# Patient Record
Sex: FEMALE | URBAN - METROPOLITAN AREA
[De-identification: names, ages, dates, MRNs, and addresses within clinical notes are randomized per-mention and may not be internally consistent; named-entity substitution may affect disease eponyms.]

---

## 2022-05-05 ENCOUNTER — OFFICE VISIT (OUTPATIENT)
Dept: INTERNAL MEDICINE | Facility: CLINIC | Age: 37
End: 2022-05-05

## 2022-05-05 VITALS
DIASTOLIC BLOOD PRESSURE: 90 MMHG | SYSTOLIC BLOOD PRESSURE: 138 MMHG | OXYGEN SATURATION: 99 % | WEIGHT: 177.8 LBS | BODY MASS INDEX: 28.57 KG/M2 | HEIGHT: 66 IN | TEMPERATURE: 97.8 F | HEART RATE: 74 BPM

## 2022-05-05 DIAGNOSIS — Z00.00 ROUTINE ADULT HEALTH MAINTENANCE: Primary | ICD-10-CM

## 2022-05-05 DIAGNOSIS — E66.3 OVERWEIGHT WITH BODY MASS INDEX (BMI) OF 28 TO 28.9 IN ADULT: ICD-10-CM

## 2022-05-05 PROCEDURE — 99385 PREV VISIT NEW AGE 18-39: CPT | Performed by: NURSE PRACTITIONER

## 2022-05-05 NOTE — ASSESSMENT & PLAN NOTE
Encouraged 150-300 minutes weekly exercise.   Continue with healthy diet choices.   Labs ordered.   Pap smear due 2024.   Continue with monthly self breast examinations.   Anticipatory guidance given regarding health prevention/wellness, diet/exercise, tobacco/alcohol/drug education, exercise and wellbeing, covid 19 guidance, and sexual health/STD education.

## 2022-05-05 NOTE — PROGRESS NOTES
"Chief Complaint  Establish Care/Annual exam    Subjective          Yi Gongora presents to [facility]  History of Present Illness  This is a 38 y/o female presenting to office for annual exam and establishment of care. Patient is currently  and has 2 children. Patient currently works as a teacher.     Patient reports walking as exercise. Patient reports following healthy diet.     Patient denies tobacco use. Patient denies alcohol use.     Patient reports last pap smear completed back in July 2021. Patient reports she has a mirena IUD. Patient reports checking breasts every month.     Patient has complaints of recent struggles with weight loss-- we discussed caloric restriction and increasing exercise. Patent offered dietician consultation-- patient would like to wait on this.     Objective   Vital Signs:  /90 (BP Location: Right arm, Patient Position: Sitting, Cuff Size: Adult)   Pulse 74   Temp 97.8 °F (36.6 °C) (Temporal)   Ht 167.6 cm (66\")   Wt 80.6 kg (177 lb 12.8 oz)   SpO2 99%   BMI 28.70 kg/m²           Physical Exam  Vitals and nursing note reviewed.   Constitutional:       Appearance: Normal appearance.   HENT:      Head: Normocephalic and atraumatic.      Nose: Nose normal.      Mouth/Throat:      Mouth: Mucous membranes are moist.   Eyes:      Extraocular Movements: Extraocular movements intact.      Conjunctiva/sclera: Conjunctivae normal.      Pupils: Pupils are equal, round, and reactive to light.   Cardiovascular:      Rate and Rhythm: Normal rate and regular rhythm.      Pulses: Normal pulses.      Heart sounds: Normal heart sounds. No murmur heard.    No friction rub. No gallop.   Pulmonary:      Effort: Pulmonary effort is normal. No respiratory distress.      Breath sounds: Normal breath sounds. No stridor. No wheezing, rhonchi or rales.   Abdominal:      General: Bowel sounds are normal. There is no distension.      Palpations: Abdomen is soft.    "   Tenderness: There is no abdominal tenderness.   Musculoskeletal:         General: No swelling or deformity. Normal range of motion.      Cervical back: Normal range of motion and neck supple.   Skin:     General: Skin is warm and dry.      Coloration: Skin is not jaundiced.      Findings: No bruising.   Neurological:      Mental Status: She is alert and oriented to person, place, and time. Mental status is at baseline.   Psychiatric:         Mood and Affect: Mood normal.         Behavior: Behavior normal.         Thought Content: Thought content normal.         Judgment: Judgment normal.        Result Review :   The following data was reviewed by: BERNICE Galvan on 05/05/2022:           Assessment and Plan    Diagnoses and all orders for this visit:    1. Routine adult health maintenance (Primary)  Assessment & Plan:  Encouraged 150-300 minutes weekly exercise.   Continue with healthy diet choices.   Labs ordered.   Pap smear due 2024.   Continue with monthly self breast examinations.   Anticipatory guidance given regarding health prevention/wellness, diet/exercise, tobacco/alcohol/drug education, exercise and wellbeing, covid 19 guidance, and sexual health/STD education.       Orders:  -     Ambulatory Referral to Gynecology  -     CBC & Differential  -     Comprehensive metabolic panel  -     Hemoglobin A1c  -     TSH Rfx On Abnormal To Free T4  -     Lipid panel  -     Hepatitis C antibody    2. Overweight with body mass index (BMI) of 28 to 28.9 in adult  Assessment & Plan:  Patient's (Body mass index is 28.7 kg/m².) indicates that they are overweight with health conditions that include none . Weight is improving with lifestyle modifications. BMI is is above average; BMI management plan is completed. We discussed portion control, increasing exercise, joining a fitness center or start home based exercise program and an rocio-based approach such as [employer] Pal or Lose It.              Follow Up   Return in  about 1 year (around 5/5/2023) for Annual physical.  Patient was given instructions and counseling regarding her condition or for health maintenance advice. Please see specific information pulled into the AVS if appropriate.

## 2022-05-05 NOTE — ASSESSMENT & PLAN NOTE
Patient's (Body mass index is 28.7 kg/m².) indicates that they are overweight with health conditions that include none . Weight is improving with lifestyle modifications. BMI is is above average; BMI management plan is completed. We discussed portion control, increasing exercise, joining a fitness center or start home based exercise program and an rocio-based approach such as [employer] Pal or Lose It.

## 2022-05-06 DIAGNOSIS — R74.01 ELEVATED ALT MEASUREMENT: Primary | ICD-10-CM

## 2022-05-06 LAB
ALBUMIN SERPL-MCNC: 4.6 G/DL (ref 3.8–4.8)
ALBUMIN/GLOB SERPL: 2.1 {RATIO} (ref 1.2–2.2)
ALP SERPL-CCNC: 223 IU/L (ref 44–121)
ALT SERPL-CCNC: 87 IU/L (ref 0–32)
AST SERPL-CCNC: 35 IU/L (ref 0–40)
BASOPHILS # BLD AUTO: 0.1 X10E3/UL (ref 0–0.2)
BASOPHILS NFR BLD AUTO: 1 %
BILIRUB SERPL-MCNC: 0.2 MG/DL (ref 0–1.2)
BUN SERPL-MCNC: 7 MG/DL (ref 6–20)
BUN/CREAT SERPL: 11 (ref 9–23)
CALCIUM SERPL-MCNC: 9.4 MG/DL (ref 8.7–10.2)
CHLORIDE SERPL-SCNC: 103 MMOL/L (ref 96–106)
CHOLEST SERPL-MCNC: 182 MG/DL (ref 100–199)
CO2 SERPL-SCNC: 21 MMOL/L (ref 20–29)
CREAT SERPL-MCNC: 0.61 MG/DL (ref 0.57–1)
EGFRCR SERPLBLD CKD-EPI 2021: 117 ML/MIN/1.73
EOSINOPHIL # BLD AUTO: 0.1 X10E3/UL (ref 0–0.4)
EOSINOPHIL NFR BLD AUTO: 2 %
ERYTHROCYTE [DISTWIDTH] IN BLOOD BY AUTOMATED COUNT: 12.1 % (ref 11.7–15.4)
GLOBULIN SER CALC-MCNC: 2.2 G/DL (ref 1.5–4.5)
GLUCOSE SERPL-MCNC: 82 MG/DL (ref 65–99)
HBA1C MFR BLD: 5.8 % (ref 4.8–5.6)
HCT VFR BLD AUTO: 36.8 % (ref 34–46.6)
HCV AB S/CO SERPL IA: <0.1 S/CO RATIO (ref 0–0.9)
HDLC SERPL-MCNC: 42 MG/DL
HGB BLD-MCNC: 11.9 G/DL (ref 11.1–15.9)
IMM GRANULOCYTES # BLD AUTO: 0.1 X10E3/UL (ref 0–0.1)
IMM GRANULOCYTES NFR BLD AUTO: 1 %
LDLC SERPL CALC-MCNC: 122 MG/DL (ref 0–99)
LYMPHOCYTES # BLD AUTO: 2.2 X10E3/UL (ref 0.7–3.1)
LYMPHOCYTES NFR BLD AUTO: 31 %
MCH RBC QN AUTO: 29 PG (ref 26.6–33)
MCHC RBC AUTO-ENTMCNC: 32.3 G/DL (ref 31.5–35.7)
MCV RBC AUTO: 90 FL (ref 79–97)
MONOCYTES # BLD AUTO: 0.4 X10E3/UL (ref 0.1–0.9)
MONOCYTES NFR BLD AUTO: 6 %
NEUTROPHILS # BLD AUTO: 4.2 X10E3/UL (ref 1.4–7)
NEUTROPHILS NFR BLD AUTO: 59 %
PLATELET # BLD AUTO: 337 X10E3/UL (ref 150–450)
POTASSIUM SERPL-SCNC: 4 MMOL/L (ref 3.5–5.2)
PROT SERPL-MCNC: 6.8 G/DL (ref 6–8.5)
RBC # BLD AUTO: 4.11 X10E6/UL (ref 3.77–5.28)
SODIUM SERPL-SCNC: 142 MMOL/L (ref 134–144)
TRIGL SERPL-MCNC: 100 MG/DL (ref 0–149)
TSH SERPL DL<=0.005 MIU/L-ACNC: 2.86 UIU/ML (ref 0.45–4.5)
VLDLC SERPL CALC-MCNC: 18 MG/DL (ref 5–40)
WBC # BLD AUTO: 7 X10E3/UL (ref 3.4–10.8)

## 2022-05-06 NOTE — PROGRESS NOTES
Please let patient know:  Overall labs are stable.   CBC is stable.   CMP shows elevated ALT-- this may be due to fatty liver disease, I'd like patient to return for lab recheck in 2 weeks. Patient will need lab appt.   A1C shows prediabetes-- would recommend following low sugar/glycemic diet and increasing exercise to 150-300 minutes weekly.   Lipid panel shows elevated LDL-- goal is under 100, patient would benefit from increasing exercise and continuing with healthy diet choices.     I would recommend patient to come back for lab recheck in 2 weeks-- I will notify her of results.

## 2023-05-16 ENCOUNTER — OFFICE VISIT (OUTPATIENT)
Dept: INTERNAL MEDICINE | Facility: CLINIC | Age: 38
End: 2023-05-16
Payer: COMMERCIAL

## 2023-05-16 VITALS
HEIGHT: 66 IN | OXYGEN SATURATION: 94 % | DIASTOLIC BLOOD PRESSURE: 96 MMHG | SYSTOLIC BLOOD PRESSURE: 118 MMHG | WEIGHT: 182 LBS | HEART RATE: 79 BPM | BODY MASS INDEX: 29.25 KG/M2

## 2023-05-16 DIAGNOSIS — Z00.00 ANNUAL PHYSICAL EXAM: Primary | ICD-10-CM

## 2023-05-16 DIAGNOSIS — R73.03 PREDIABETES: ICD-10-CM

## 2023-05-16 DIAGNOSIS — E66.3 OVERWEIGHT WITH BODY MASS INDEX (BMI) OF 28 TO 28.9 IN ADULT: ICD-10-CM

## 2023-05-16 DIAGNOSIS — E78.2 MIXED HYPERLIPIDEMIA: ICD-10-CM

## 2023-05-16 RX ORDER — MULTIPLE VITAMINS W/ MINERALS TAB 9MG-400MCG
1 TAB ORAL AS NEEDED
COMMUNITY

## 2023-05-16 NOTE — PROGRESS NOTES
"Chief Complaint  Annual Exam    Subjective        Yi Gongora presents to [facility]  History of Present Illness  This is a 39 y/o female presenting to office for annual exam. Continues working as a teacher.     Patient reports she continues with regular exercise. Following healthy diet. Patient continues struggling with losing weight. Would like to discuss wegovy option with  at home before committing to treatment.     UTD with pap smear 2021. Continues with monthly breast exams. Will be scheduling with new gynecological provider.     HLD-- continues with diet and lifestyle modifications.     Prediabetes-- continues trying to limit sugars/refined carbohydrates.     UTD with dental/vision exams.       Objective   Vital Signs:  /96 (BP Location: Left arm, Patient Position: Sitting, Cuff Size: Adult)   Pulse 79   Ht 167.6 cm (66\")   Wt 82.6 kg (182 lb)   SpO2 94%   BMI 29.38 kg/m²   Estimated body mass index is 29.38 kg/m² as calculated from the following:    Height as of this encounter: 167.6 cm (66\").    Weight as of this encounter: 82.6 kg (182 lb).             Physical Exam  Constitutional:       General: She is awake.      Appearance: Normal appearance.   HENT:      Head: Normocephalic and atraumatic.      Right Ear: Hearing and external ear normal.      Left Ear: Hearing and external ear normal.      Nose: Nose normal. No congestion.      Mouth/Throat:      Lips: Pink.      Mouth: Mucous membranes are moist.      Pharynx: Oropharynx is clear. No oropharyngeal exudate.   Eyes:      Extraocular Movements: Extraocular movements intact.      Conjunctiva/sclera: Conjunctivae normal.      Pupils: Pupils are equal, round, and reactive to light.   Neck:      Vascular: No carotid bruit.   Cardiovascular:      Rate and Rhythm: Normal rate and regular rhythm.      Pulses: Normal pulses.      Heart sounds: Normal heart sounds. No murmur heard.    No friction rub. No gallop. "   Pulmonary:      Effort: Pulmonary effort is normal. No respiratory distress.      Breath sounds: Normal breath sounds. No stridor. No wheezing, rhonchi or rales.   Abdominal:      General: Abdomen is flat. Bowel sounds are normal. There is no distension.      Palpations: Abdomen is soft.      Tenderness: There is no abdominal tenderness. There is no guarding.   Musculoskeletal:         General: No swelling. Normal range of motion.      Cervical back: Normal range of motion and neck supple.   Skin:     General: Skin is warm and dry.      Capillary Refill: Capillary refill takes less than 2 seconds.      Coloration: Skin is not jaundiced.   Neurological:      General: No focal deficit present.      Mental Status: She is alert and oriented to person, place, and time. Mental status is at baseline.      Motor: Motor function is intact.      Coordination: Coordination is intact.      Gait: Gait is intact.      Deep Tendon Reflexes: Reflexes are normal and symmetric.   Psychiatric:         Attention and Perception: Attention normal.         Mood and Affect: Mood normal.         Speech: Speech normal.         Behavior: Behavior normal. Behavior is cooperative.         Thought Content: Thought content normal.         Cognition and Memory: Cognition normal.         Judgment: Judgment normal.        Result Review :  The following data was reviewed by: BERNICE Galvan on 05/16/2023:                   Assessment and Plan   Diagnoses and all orders for this visit:    1. Annual physical exam (Primary)  Assessment & Plan:  Recommended 150-300 minutes weekly exercise.   Continue with heart healthy low glycemic diet choices.   Labs ordered.   Pap smear UTD 2021  Continue with monthly self breast examinations  Anticipatory guidance given regarding health prevention/wellness, diet/exercise, tobacco/alcohol/drug education, exercise and wellbeing, covid 19 guidance, vaccination recommendations, and sexual health/STD education.    Recommended bi-yearly dental exams and regular vision examinations.       Orders:  -     CBC & Differential; Future  -     Comprehensive metabolic panel; Future    2. Prediabetes  Assessment & Plan:  Recommended low glycemic diet choices and continued regular exercise.     Orders:  -     Hemoglobin A1c; Future    3. Overweight with body mass index (BMI) of 28 to 28.9 in adult  Assessment & Plan:  Patient's (Body mass index is 29.38 kg/m².) indicates that they are overweight with health conditions that include dyslipidemias . Weight is unchanged. BMI is is above average; BMI management plan is completed. We discussed portion control, increasing exercise and pharmacologic options including wegovy.       4. Mixed hyperlipidemia  Assessment & Plan:  Lipid abnormalities are unchanged.  Nutritional counseling was provided.  Lipids will be reassessed in 1 year.    Orders:  -     Lipid panel; Future  -     TSH; Future  -     T4, free; Future           Follow Up   Return in about 1 year (around 5/16/2024) for Annual physical.  Patient was given instructions and counseling regarding her condition or for health maintenance advice. Please see specific information pulled into the AVS if appropriate.

## 2023-05-16 NOTE — ASSESSMENT & PLAN NOTE
Recommended 150-300 minutes weekly exercise.   Continue with heart healthy low glycemic diet choices.   Labs ordered.   Pap smear UTD 2021  Continue with monthly self breast examinations  Anticipatory guidance given regarding health prevention/wellness, diet/exercise, tobacco/alcohol/drug education, exercise and wellbeing, covid 19 guidance, vaccination recommendations, and sexual health/STD education.   Recommended bi-yearly dental exams and regular vision examinations.

## 2023-05-16 NOTE — ASSESSMENT & PLAN NOTE
Patient's (Body mass index is 29.38 kg/m².) indicates that they are overweight with health conditions that include dyslipidemias . Weight is unchanged. BMI is is above average; BMI management plan is completed. We discussed portion control, increasing exercise and pharmacologic options including wegovy.

## 2023-08-10 ENCOUNTER — TELEPHONE (OUTPATIENT)
Dept: INTERNAL MEDICINE | Facility: CLINIC | Age: 38
End: 2023-08-10
Payer: COMMERCIAL

## 2024-08-23 ENCOUNTER — OFFICE VISIT (OUTPATIENT)
Dept: INTERNAL MEDICINE | Facility: CLINIC | Age: 39
End: 2024-08-23
Payer: COMMERCIAL

## 2024-08-23 VITALS
HEART RATE: 81 BPM | HEIGHT: 67 IN | WEIGHT: 183 LBS | SYSTOLIC BLOOD PRESSURE: 130 MMHG | BODY MASS INDEX: 28.72 KG/M2 | OXYGEN SATURATION: 99 % | TEMPERATURE: 97.3 F | DIASTOLIC BLOOD PRESSURE: 92 MMHG

## 2024-08-23 DIAGNOSIS — Z12.31 SCREENING MAMMOGRAM FOR BREAST CANCER: ICD-10-CM

## 2024-08-23 DIAGNOSIS — Z00.00 ANNUAL PHYSICAL EXAM: Primary | ICD-10-CM

## 2024-08-23 DIAGNOSIS — R73.03 PREDIABETES: ICD-10-CM

## 2024-08-23 DIAGNOSIS — E78.2 MIXED HYPERLIPIDEMIA: ICD-10-CM

## 2024-08-23 DIAGNOSIS — E66.3 OVERWEIGHT WITH BODY MASS INDEX (BMI) OF 28 TO 28.9 IN ADULT: ICD-10-CM

## 2024-08-23 RX ORDER — LEVONORGESTREL 52 MG/1
1 INTRAUTERINE DEVICE INTRAUTERINE
COMMUNITY

## 2024-08-23 NOTE — ASSESSMENT & PLAN NOTE
Lipid panel ordered  Recommended low fat diet, exercise as tolerated  The 10-year ASCVD risk score (Luigi MANCILLA, et al., 2019) is: 0.8%    Values used to calculate the score:      Age: 39 years      Sex: Female      Is Non- : No      Diabetic: No      Tobacco smoker: No      Systolic Blood Pressure: 130 mmHg      Is BP treated: No      HDL Cholesterol: 42 mg/dL      Total Cholesterol: 182 mg/dL

## 2024-08-23 NOTE — PROGRESS NOTES
"Chief Complaint  Annual Exam    Subjective        Yi Gongora presents to [facility]  History of Present Illness  This is a 38 y/o female presenting to office for CPE.     Reports active lifestyle with being pre-.   Reports following healthy diet choices.     Denies tobacco use or alcohol use.     Due for pap-- due; last 2021  Mammogram is due     UTD with dental exam.     Objective   Vital Signs:  /92 (BP Location: Left arm, Patient Position: Sitting, Cuff Size: Adult)   Pulse 81   Temp 97.3 °F (36.3 °C) (Temporal)   Ht 170.2 cm (67\")   Wt 83 kg (183 lb)   SpO2 99%   BMI 28.66 kg/m²   Estimated body mass index is 28.66 kg/m² as calculated from the following:    Height as of this encounter: 170.2 cm (67\").    Weight as of this encounter: 83 kg (183 lb).    BMI is >= 25 and <30. (Overweight) The following options were offered after discussion;: exercise counseling/recommendations and nutrition counseling/recommendations      Physical Exam  Constitutional:       General: She is awake.      Appearance: Normal appearance.   HENT:      Head: Normocephalic and atraumatic.      Right Ear: Hearing, tympanic membrane, ear canal and external ear normal.      Left Ear: Hearing, tympanic membrane, ear canal and external ear normal.      Nose: Nose normal.      Mouth/Throat:      Lips: Pink.      Mouth: Mucous membranes are moist.      Pharynx: Oropharynx is clear.   Eyes:      Extraocular Movements: Extraocular movements intact.      Conjunctiva/sclera: Conjunctivae normal.      Pupils: Pupils are equal, round, and reactive to light.   Neck:      Vascular: No carotid bruit.   Cardiovascular:      Rate and Rhythm: Normal rate and regular rhythm.      Pulses: Normal pulses.      Heart sounds: Normal heart sounds. No murmur heard.     No friction rub. No gallop.   Pulmonary:      Effort: Pulmonary effort is normal. No respiratory distress.      Breath sounds: Normal breath " sounds. No stridor. No wheezing, rhonchi or rales.   Abdominal:      General: Abdomen is flat. Bowel sounds are normal. There is no distension.      Palpations: Abdomen is soft.      Tenderness: There is no abdominal tenderness. There is no guarding.   Musculoskeletal:         General: No swelling. Normal range of motion.      Cervical back: Normal range of motion and neck supple.   Skin:     General: Skin is warm and dry.      Capillary Refill: Capillary refill takes less than 2 seconds.      Coloration: Skin is not jaundiced.   Neurological:      General: No focal deficit present.      Mental Status: She is alert and oriented to person, place, and time. Mental status is at baseline.      Motor: Motor function is intact.      Coordination: Coordination is intact.      Gait: Gait is intact.      Deep Tendon Reflexes: Reflexes are normal and symmetric.   Psychiatric:         Attention and Perception: Attention normal.         Mood and Affect: Mood normal.         Speech: Speech normal.         Behavior: Behavior normal. Behavior is cooperative.         Thought Content: Thought content normal.         Cognition and Memory: Cognition normal.         Judgment: Judgment normal.        Result Review :  The following data was reviewed by: BERNICE Galvan on 08/23/2024:    Tobacco Use: Low Risk  (8/23/2024)    Patient History     Smoking Tobacco Use: Never     Smokeless Tobacco Use: Never     Passive Exposure: Never     Social History     Substance and Sexual Activity   Alcohol Use Never     History reviewed. No pertinent family history.            Assessment and Plan   Diagnoses and all orders for this visit:    1. Annual physical exam (Primary)  Assessment & Plan:  Recommended 150-300 minutes weekly exercise.   Continue with heart healthy low glycemic diet choices.   Labs ordered.   Pap smear due-- referral placed to gynecology  Mammogram ordered  Continue with monthly self breast examinations  Anticipatory guidance  given regarding health prevention/wellness, diet/exercise, tobacco/alcohol/drug education, exercise and wellbeing, covid 19 guidance, vaccination recommendations, and sexual health/STD education.   Recommended bi-yearly dental exams and regular vision examinations.    Orders:  -     Ambulatory Referral to Gynecology  -     CBC & Differential; Future  -     Comprehensive metabolic panel; Future    2. Prediabetes  Assessment & Plan:  Recommended low glycemic diet choices  Lab ordered    Orders:  -     Hemoglobin A1c; Future    3. Mixed hyperlipidemia  Assessment & Plan:  Lipid panel ordered  Recommended low fat diet, exercise as tolerated  The 10-year ASCVD risk score (Luigi MANCILLA, et al., 2019) is: 0.8%    Values used to calculate the score:      Age: 39 years      Sex: Female      Is Non- : No      Diabetic: No      Tobacco smoker: No      Systolic Blood Pressure: 130 mmHg      Is BP treated: No      HDL Cholesterol: 42 mg/dL      Total Cholesterol: 182 mg/dL       Orders:  -     TSH Rfx On Abnormal To Free T4; Future  -     Lipid panel; Future    4. Screening mammogram for breast cancer  -     Mammo screening digital tomosynthesis bilateral w CAD; Future    5. Overweight with body mass index (BMI) of 28 to 28.9 in adult  Assessment & Plan:  BMI is >= 25 and <30. (Overweight) The following options were offered after discussion;: exercise counseling/recommendations and nutrition counseling/recommendations               Follow Up   Return in about 1 year (around 8/23/2025) for Annual physical.  Patient was given instructions and counseling regarding her condition or for health maintenance advice. Please see specific information pulled into the AVS if appropriate.

## 2024-08-23 NOTE — ASSESSMENT & PLAN NOTE
Recommended 150-300 minutes weekly exercise.   Continue with heart healthy low glycemic diet choices.   Labs ordered.   Pap smear due-- referral placed to gynecology  Mammogram ordered  Continue with monthly self breast examinations  Anticipatory guidance given regarding health prevention/wellness, diet/exercise, tobacco/alcohol/drug education, exercise and wellbeing, covid 19 guidance, vaccination recommendations, and sexual health/STD education.   Recommended bi-yearly dental exams and regular vision examinations.

## 2025-01-16 ENCOUNTER — TELEPHONE (OUTPATIENT)
Dept: INTERNAL MEDICINE | Facility: CLINIC | Age: 40
End: 2025-01-16
Payer: COMMERCIAL

## 2025-01-16 NOTE — TELEPHONE ENCOUNTER
"Relay     \"You are overdue to have your mammogram done. Please call [phone] to schedule an appointment with [facility].\"  "

## 2025-02-13 ENCOUNTER — TELEPHONE (OUTPATIENT)
Dept: INTERNAL MEDICINE | Facility: CLINIC | Age: 40
End: 2025-02-13
Payer: COMMERCIAL

## 2025-02-13 NOTE — TELEPHONE ENCOUNTER
Left voicemail for patient to return call and let us know if she plans to get fasting labs completed or not     Hub okay to relay

## 2025-03-20 ENCOUNTER — TELEPHONE (OUTPATIENT)
Dept: INTERNAL MEDICINE | Facility: CLINIC | Age: 40
End: 2025-03-20
Payer: COMMERCIAL

## 2025-03-20 NOTE — TELEPHONE ENCOUNTER
Request to cancel outstanding order:     [x] Lab   [] Imaging   [] Referral     Date order placed: August 25, 2024    Reason:   [] Duplicate  [] Patient declined and it is noted  [x] Patient has not scheduled (see below)   [] Other:     For patient that has not scheduled:     Patient was contacted 3 times:     1) Telephone Message on February 13, 2025  2) [facility] Message  on August 24, 2024   3) letter mailed on December 20, 2024      For patients seeing this message on [facility]: An attempt was made to contact you to schedule an order 3 times unsuccessfully.  If you feel this is in error, please contact your provider.

## 2025-08-29 ENCOUNTER — OFFICE VISIT (OUTPATIENT)
Dept: INTERNAL MEDICINE | Facility: CLINIC | Age: 40
End: 2025-08-29
Payer: COMMERCIAL

## 2025-08-29 VITALS
OXYGEN SATURATION: 99 % | HEIGHT: 67 IN | WEIGHT: 183 LBS | BODY MASS INDEX: 28.72 KG/M2 | HEART RATE: 70 BPM | SYSTOLIC BLOOD PRESSURE: 120 MMHG | DIASTOLIC BLOOD PRESSURE: 82 MMHG

## 2025-08-29 DIAGNOSIS — Z00.00 ANNUAL PHYSICAL EXAM: Primary | ICD-10-CM

## 2025-08-29 DIAGNOSIS — Z12.31 SCREENING MAMMOGRAM FOR BREAST CANCER: ICD-10-CM

## 2025-08-29 DIAGNOSIS — E78.2 MIXED HYPERLIPIDEMIA: Chronic | ICD-10-CM

## 2025-08-29 DIAGNOSIS — R73.03 PREDIABETES: Chronic | ICD-10-CM

## 2025-08-29 DIAGNOSIS — Z97.5 IUD (INTRAUTERINE DEVICE) IN PLACE: Chronic | ICD-10-CM
